# Patient Record
Sex: FEMALE
[De-identification: names, ages, dates, MRNs, and addresses within clinical notes are randomized per-mention and may not be internally consistent; named-entity substitution may affect disease eponyms.]

---

## 2023-12-18 PROBLEM — Z00.00 ENCOUNTER FOR PREVENTIVE HEALTH EXAMINATION: Status: ACTIVE | Noted: 2023-12-18

## 2023-12-20 ENCOUNTER — APPOINTMENT (OUTPATIENT)
Dept: HEPATOLOGY | Facility: CLINIC | Age: 68
End: 2023-12-20
Payer: MEDICARE

## 2023-12-20 VITALS
RESPIRATION RATE: 18 BRPM | BODY MASS INDEX: 33.74 KG/M2 | SYSTOLIC BLOOD PRESSURE: 161 MMHG | HEIGHT: 67 IN | WEIGHT: 214.95 LBS | HEART RATE: 80 BPM | TEMPERATURE: 97.1 F | DIASTOLIC BLOOD PRESSURE: 91 MMHG | OXYGEN SATURATION: 95 %

## 2023-12-20 DIAGNOSIS — R79.89 OTHER SPECIFIED ABNORMAL FINDINGS OF BLOOD CHEMISTRY: ICD-10-CM

## 2023-12-20 PROCEDURE — 99203 OFFICE O/P NEW LOW 30 MIN: CPT

## 2024-01-26 ENCOUNTER — NON-APPOINTMENT (OUTPATIENT)
Age: 69
End: 2024-01-26

## 2024-02-01 LAB
AFP-TM SERPL-MCNC: 7.8 NG/ML
GGT SERPL-CCNC: 280 U/L

## 2024-02-04 LAB
ALBUMIN SERPL ELPH-MCNC: 4.4 G/DL
ALP BLD-CCNC: 146 U/L
ALT SERPL-CCNC: 66 U/L
ANA PAT FLD IF-IMP: ABNORMAL
ANA PATTERN: ABNORMAL
ANA SER IF-ACNC: ABNORMAL
ANA TITER: ABNORMAL
ANION GAP SERPL CALC-SCNC: 14 MMOL/L
AST SERPL-CCNC: 61 U/L
BILIRUB SERPL-MCNC: 0.9 MG/DL
BUN SERPL-MCNC: 9 MG/DL
CALCIUM SERPL-MCNC: 9.6 MG/DL
CHLORIDE SERPL-SCNC: 98 MMOL/L
CO2 SERPL-SCNC: 23 MMOL/L
CREAT SERPL-MCNC: 0.7 MG/DL
EGFR: 94 ML/MIN/1.73M2
ENA SS-A AB SER IA-ACNC: <0.2 AL
ENA SS-B AB SER IA-ACNC: <0.2 AL
GLUCOSE SERPL-MCNC: 122 MG/DL
HCT VFR BLD CALC: 46.7 %
HGB BLD-MCNC: 16.3 G/DL
IGA SER QL IEP: 252 MG/DL
IGG SER QL IEP: 2241 MG/DL
IGG SER QL IEP: 2241 MG/DL
IGG1 SER-MCNC: 1148 MG/DL
IGG2 SER-MCNC: 878 MG/DL
IGG3 SER-MCNC: >216 MG/DL
IGG4 SER-MCNC: 79.2 MG/DL
IGM SER QL IEP: 376 MG/DL
MCHC RBC-ENTMCNC: 31.8 PG
MCHC RBC-ENTMCNC: 34.9 GM/DL
MCV RBC AUTO: 91 FL
MITOCHONDRIA AB SER IF-ACNC: ABNORMAL
PLATELET # BLD AUTO: 329 K/UL
POTASSIUM SERPL-SCNC: 4.1 MMOL/L
PROT SERPL-MCNC: 8.4 G/DL
RBC # BLD: 5.13 M/UL
RBC # FLD: 12.1 %
SMOOTH MUSCLE AB SER QL IF: NORMAL
SODIUM SERPL-SCNC: 135 MMOL/L
WBC # FLD AUTO: 4.57 K/UL

## 2024-02-17 NOTE — PHYSICAL EXAM
[General Appearance - Alert] : alert [General Appearance - In No Acute Distress] : in no acute distress [General Appearance - Well Nourished] : well nourished [General Appearance - Well Developed] : well developed [Sclera] : the sclera and conjunctiva were normal [Neck Appearance] : the appearance of the neck was normal [Respiration, Rhythm And Depth] : normal respiratory rhythm and effort [Exaggerated Use Of Accessory Muscles For Inspiration] : no accessory muscle use [Auscultation Breath Sounds / Voice Sounds] : lungs were clear to auscultation bilaterally [Apical Impulse] : the apical impulse was normal [Heart Rate And Rhythm] : heart rate was normal and rhythm regular [Heart Sounds] : normal S1 and S2 [Heart Sounds Gallop] : no gallops [Edema] : there was no peripheral edema [Bowel Sounds] : normal bowel sounds [Abdomen Soft] : soft [Abdomen Tenderness] : non-tender [] : no hepato-splenomegaly [Abdomen Mass (___ Cm)] : no abdominal mass palpated [Abnormal Walk] : normal gait [Skin Color & Pigmentation] : normal skin color and pigmentation [Skin Turgor] : normal skin turgor [No Focal Deficits] : no focal deficits [Oriented To Time, Place, And Person] : oriented to person, place, and time [Impaired Insight] : insight and judgment were intact [Affect] : the affect was normal [Spider Angioma] : No spider angioma(s) were observed [Abdominal  Ascites] : no ascites [Splenomegaly] : no splenomegaly [Caput Medusae] : no caput medusae observed [Asterixis] : no asterixis observed [Jaundice] : No jaundice

## 2024-02-17 NOTE — END OF VISIT
[FreeTextEntry3] : I have seen and examined patient at bedside. I agree with hx, ROS, physical examination, imp/suggestions as written by Ms. Tara Barajas NP. Please see my note. [Time Spent: ___ minutes] : I have spent [unfilled] minutes of time on the encounter.

## 2024-02-17 NOTE — HISTORY OF PRESENT ILLNESS
[FreeTextEntry1] : RFR: Elevated LFTs Referring MD: Dr. Oviedo PCP    Ms. Yvonne El is a 66y. F presenting today for an initial evaluation of elevated liver enzymes. PMHx of HTN, anxiety, HLD, arthritis. Alcohol use significant for 1 shot of vodka @ night since 7-8 years ago w/ passing of  "needed it to help me sleep". Has not had alcohol for 6 weeks now.   Ms. Nelson states that ~5 years ago, she recalls that she was very ill with the flu 2 weeks prior to her PCP visit and "took a lot of tylenol and medications".  As per the pt, her liver enzymes were noted to be elevated and went through a comprehensive workup, including imaging & biopsy @ [facility], but all studies were "normal". Also endorses recent history of taking aleve, tylenol, and vodka to help with R. wrist pain/ arthritic pain. Again, presented to PCP a few weeks after and was noted have elevated LFTs. Pt states that she has stopped ETOH and made complete lifestyle/ dietary changes 6 weeks ago when she was notified about her elevated LFTs. Cut out carbs, changed to high protein diet and is noticing significant weight loss. Additionally, the pt shares that she has is currently taking simvastatin 20 mg (was on 40 mg for 20 years), but has been taking this medication for 25 years and never has had an issue, but given recent LFT elevations, Dr. Sosa decreased her dose to 20mg.   Today, the pt denies chest pain, SOB, palpitations, lightheadedness/ dizziness, melena, itching, dry eyes/ mouth or acute hospitalizations or ER visits.   [Other Medical Hx] See HPI  [Surgical Hx] R. wrist ORIF 2015, 2016, R. breast bx & lumpectomy 30 years ago    [Allergies] NKDA, NKA     [Medications] xanax 0.25 mg PRN, atenolol HCTZ 50/25 once daily, potassium 10 mEq daily, vitamin D 2000 daily   [FmH of liver disease] Denies    [Social Hx] - Occupation: , retired  - Alcohol: Quit 6 weeks ago. ETOH 7-8 years ago to cope with passing of   - Tobacco: Denies - Illicit drug: Denies        - Herb and dietary Supplement: Denies   [Labs] 11/28/23 (transcribed from results from pt's phone chart)  Tb 0.9 , AST/ALT 72/ 76, AlkP 144  AFP 8.3 AMA 78 (H) Mitochondrial Ab 153     11/4/23 (transcribed from results from pt's phone chart)  H/H 15.7 46.2 MCV 92 Tb 1.0, AST/ALT 71/82, AlkP 149    Ferritin 309 (H), TIBC 363, UIBC 233, Iron 130 WNL, iron 36  HbsAg Negative, HbcAb negative, Hb surface Ab quant 36 HCV non reactive, HIV non reactive  HgbA1c 5.8 (H)   11/2/23 (transcribed from results from pt's phone chart)  IgM 448, IgG 2500   11/18/22 (transcribed from results from pt's phone chart)  AST/ALT 47/44, AlkP 11 Tb 1.2   [Imaging] - Fibroscan: - US Abd: "many years ago, was negative" - MRI/ MRCP:    [Procedures]  EGD: never had, "i dont plan to" Colonoscopy: 2022 never had, but had cologuard. was - 
clear

## 2024-02-17 NOTE — ASSESSMENT
[FreeTextEntry1] : Ms. Yvonne El is a 66y. F presenting today for an initial evaluation of elevated liver enzymes. Hx of previous liver bx @ [facility] with no concerning findings as per pt. Additional PMHx of HTN, anxiety, HLD, arthritis.   12/20/23: Fibroscan , kPa 4.7, IQR15% S0,F0  Diff dx: PBC (AMA+, + IgG)  - Early stage  - Denying dry mouth, dry eyes - AMA +, IgG +  - Educated pt about likely dx of PBC and disease progression. Reassurance provided. Also advised to continue ETOH cessation  - Discussed possibly starting ursodiol, educated about medication and minimal side effect profile. However, will defer for now until 5 week labs result. Decision to be made at next visit. Encouraged pt to obtain bx report from [facility].   Plan - Defer ursodiol until next visit, need to assess b/w results  - abd US prior to next visit  - repeat b/w 5 weeks from now  - RTO 6 weeks  Plans received well by pt, all questions and concerns answered by conclusion of visit

## 2024-02-17 NOTE — REVIEW OF SYSTEMS
[Anxiety] : anxiety [Fever] : no fever [Chills] : no chills [Feeling Poorly] : not feeling poorly [Feeling Tired] : not feeling tired [Heart Rate Is Slow] : the heart rate was not slow [Heart Rate Is Fast] : the heart rate was not fast [Chest Pain] : no chest pain [Palpitations] : no palpitations [Shortness Of Breath] : no shortness of breath [Wheezing] : no wheezing [Cough] : no cough [SOB on Exertion] : no shortness of breath during exertion [Abdominal Pain] : no abdominal pain [Vomiting] : no vomiting [Constipation] : no constipation [Diarrhea] : no diarrhea [Skin Lesions] : no skin lesions [Skin Wound] : no skin wound [Confused] : no confusion [Depression] : no depression

## 2024-03-11 ENCOUNTER — NON-APPOINTMENT (OUTPATIENT)
Age: 69
End: 2024-03-11

## 2024-03-12 LAB
ALBUMIN SERPL ELPH-MCNC: 4.6 G/DL
ALP BLD-CCNC: 113 U/L
ALT SERPL-CCNC: 36 U/L
ANION GAP SERPL CALC-SCNC: 13 MMOL/L
AST SERPL-CCNC: 32 U/L
BASOPHILS # BLD AUTO: 0.04 K/UL
BASOPHILS NFR BLD AUTO: 0.8 %
BILIRUB SERPL-MCNC: 0.8 MG/DL
BUN SERPL-MCNC: 10 MG/DL
CALCIUM SERPL-MCNC: 10.3 MG/DL
CHLORIDE SERPL-SCNC: 98 MMOL/L
CO2 SERPL-SCNC: 26 MMOL/L
CREAT SERPL-MCNC: 0.8 MG/DL
EGFR: 80 ML/MIN/1.73M2
EOSINOPHIL # BLD AUTO: 0.09 K/UL
EOSINOPHIL NFR BLD AUTO: 1.8 %
GLUCOSE SERPL-MCNC: 134 MG/DL
HCT VFR BLD CALC: 47.1 %
HGB BLD-MCNC: 16.2 G/DL
IMM GRANULOCYTES NFR BLD AUTO: 0.2 %
LYMPHOCYTES # BLD AUTO: 1.23 K/UL
LYMPHOCYTES NFR BLD AUTO: 24.7 %
MAN DIFF?: NORMAL
MCHC RBC-ENTMCNC: 31.2 PG
MCHC RBC-ENTMCNC: 34.4 GM/DL
MCV RBC AUTO: 90.8 FL
MONOCYTES # BLD AUTO: 0.48 K/UL
MONOCYTES NFR BLD AUTO: 9.6 %
NEUTROPHILS # BLD AUTO: 3.13 K/UL
NEUTROPHILS NFR BLD AUTO: 62.9 %
PLATELET # BLD AUTO: 312 K/UL
POTASSIUM SERPL-SCNC: 4.2 MMOL/L
PROT SERPL-MCNC: 8.4 G/DL
RBC # BLD: 5.19 M/UL
RBC # FLD: 12.8 %
SODIUM SERPL-SCNC: 136 MMOL/L
WBC # FLD AUTO: 4.98 K/UL

## 2024-03-18 ENCOUNTER — APPOINTMENT (OUTPATIENT)
Dept: HEPATOLOGY | Facility: CLINIC | Age: 69
End: 2024-03-18

## 2024-06-19 LAB
ALBUMIN SERPL ELPH-MCNC: 4.4 G/DL
ALP BLD-CCNC: 101 U/L
ALT SERPL-CCNC: 27 U/L
ANION GAP SERPL CALC-SCNC: 16 MMOL/L
AST SERPL-CCNC: 26 U/L
BASOPHILS # BLD AUTO: 0.03 K/UL
BASOPHILS NFR BLD AUTO: 0.8 %
BILIRUB SERPL-MCNC: 0.7 MG/DL
BUN SERPL-MCNC: 12 MG/DL
CALCIUM SERPL-MCNC: 9.6 MG/DL
CHLORIDE SERPL-SCNC: 102 MMOL/L
CO2 SERPL-SCNC: 20 MMOL/L
CREAT SERPL-MCNC: 0.7 MG/DL
EGFR: 94 ML/MIN/1.73M2
EOSINOPHIL # BLD AUTO: 0.11 K/UL
EOSINOPHIL NFR BLD AUTO: 2.8 %
GLUCOSE SERPL-MCNC: 121 MG/DL
HCT VFR BLD CALC: 45.8 %
HGB BLD-MCNC: 15.6 G/DL
IGG SER QL IEP: 1616 MG/DL
IMM GRANULOCYTES NFR BLD AUTO: 0.5 %
LYMPHOCYTES # BLD AUTO: 0.79 K/UL
LYMPHOCYTES NFR BLD AUTO: 20.2 %
MAN DIFF?: NORMAL
MCHC RBC-ENTMCNC: 31.2 PG
MCHC RBC-ENTMCNC: 34.1 GM/DL
MCV RBC AUTO: 91.6 FL
MONOCYTES # BLD AUTO: 0.43 K/UL
MONOCYTES NFR BLD AUTO: 11 %
NEUTROPHILS # BLD AUTO: 2.53 K/UL
NEUTROPHILS NFR BLD AUTO: 64.7 %
PLATELET # BLD AUTO: 298 K/UL
POTASSIUM SERPL-SCNC: 3.6 MMOL/L
PROT SERPL-MCNC: 7.8 G/DL
RBC # BLD: 5 M/UL
RBC # FLD: 12.2 %
SODIUM SERPL-SCNC: 139 MMOL/L
WBC # FLD AUTO: 3.91 K/UL

## 2024-06-19 RX ORDER — URSODIOL 500 MG/1
500 TABLET ORAL TWICE DAILY
Qty: 60 | Refills: 5 | Status: ACTIVE | COMMUNITY
Start: 2024-01-29 | End: 1900-01-01

## 2024-06-24 ENCOUNTER — APPOINTMENT (OUTPATIENT)
Dept: HEPATOLOGY | Facility: CLINIC | Age: 69
End: 2024-06-24
Payer: MEDICARE

## 2024-06-24 VITALS
WEIGHT: 209 LBS | BODY MASS INDEX: 32.8 KG/M2 | SYSTOLIC BLOOD PRESSURE: 143 MMHG | HEIGHT: 67 IN | TEMPERATURE: 97.9 F | DIASTOLIC BLOOD PRESSURE: 86 MMHG | HEART RATE: 71 BPM | RESPIRATION RATE: 18 BRPM | OXYGEN SATURATION: 94 %

## 2024-06-24 DIAGNOSIS — D58.2 OTHER HEMOGLOBINOPATHIES: ICD-10-CM

## 2024-06-24 DIAGNOSIS — K74.3 PRIMARY BILIARY CIRRHOSIS: ICD-10-CM

## 2024-06-24 PROCEDURE — 99214 OFFICE O/P EST MOD 30 MIN: CPT

## 2024-08-27 ENCOUNTER — APPOINTMENT (OUTPATIENT)
Dept: FAMILY MEDICINE | Facility: CLINIC | Age: 69
End: 2024-08-27

## 2024-12-09 ENCOUNTER — APPOINTMENT (OUTPATIENT)
Dept: HEPATOLOGY | Facility: CLINIC | Age: 69
End: 2024-12-09

## 2025-05-20 ENCOUNTER — NON-APPOINTMENT (OUTPATIENT)
Age: 70
End: 2025-05-20

## 2025-05-21 ENCOUNTER — APPOINTMENT (OUTPATIENT)
Dept: HEPATOLOGY | Facility: CLINIC | Age: 70
End: 2025-05-21

## 2025-05-21 VITALS
DIASTOLIC BLOOD PRESSURE: 72 MMHG | BODY MASS INDEX: 32.18 KG/M2 | SYSTOLIC BLOOD PRESSURE: 151 MMHG | OXYGEN SATURATION: 95 % | HEART RATE: 89 BPM | HEIGHT: 67 IN | TEMPERATURE: 96.6 F | RESPIRATION RATE: 17 BRPM | WEIGHT: 205 LBS

## 2025-05-21 DIAGNOSIS — R79.89 OTHER SPECIFIED ABNORMAL FINDINGS OF BLOOD CHEMISTRY: ICD-10-CM

## 2025-05-21 DIAGNOSIS — K74.3 PRIMARY BILIARY CIRRHOSIS: ICD-10-CM

## 2025-05-21 DIAGNOSIS — D58.2 OTHER HEMOGLOBINOPATHIES: ICD-10-CM

## 2025-05-21 LAB
ALBUMIN SERPL ELPH-MCNC: 4.4 G/DL
ALP BLD-CCNC: 93 U/L
ALT SERPL-CCNC: 24 U/L
ANION GAP SERPL CALC-SCNC: 16 MMOL/L
AST SERPL-CCNC: 24 U/L
BASOPHILS # BLD AUTO: 0.03 K/UL
BASOPHILS NFR BLD AUTO: 0.7 %
BILIRUB SERPL-MCNC: 0.8 MG/DL
BUN SERPL-MCNC: 11 MG/DL
CALCIUM SERPL-MCNC: 10 MG/DL
CHLORIDE SERPL-SCNC: 102 MMOL/L
CO2 SERPL-SCNC: 21 MMOL/L
CREAT SERPL-MCNC: 0.68 MG/DL
EGFRCR SERPLBLD CKD-EPI 2021: 94 ML/MIN/1.73M2
EOSINOPHIL # BLD AUTO: 0.09 K/UL
EOSINOPHIL NFR BLD AUTO: 2.2 %
GGT SERPL-CCNC: 24 U/L
GLUCOSE SERPL-MCNC: 112 MG/DL
HCT VFR BLD CALC: 43.8 %
HGB BLD-MCNC: 15.3 G/DL
IGG SERPL-MCNC: 1405 MG/DL
IMM GRANULOCYTES NFR BLD AUTO: 0.2 %
LYMPHOCYTES # BLD AUTO: 0.74 K/UL
LYMPHOCYTES NFR BLD AUTO: 18.3 %
MAN DIFF?: NORMAL
MCHC RBC-ENTMCNC: 30.7 PG
MCHC RBC-ENTMCNC: 34.9 G/DL
MCV RBC AUTO: 88 FL
MONOCYTES # BLD AUTO: 0.37 K/UL
MONOCYTES NFR BLD AUTO: 9.2 %
NEUTROPHILS # BLD AUTO: 2.8 K/UL
NEUTROPHILS NFR BLD AUTO: 69.4 %
PLATELET # BLD AUTO: 226 K/UL
POTASSIUM SERPL-SCNC: 3.7 MMOL/L
PROT SERPL-MCNC: 7.5 G/DL
RBC # BLD: 4.98 M/UL
RBC # FLD: 12.3 %
SODIUM SERPL-SCNC: 139 MMOL/L
WBC # FLD AUTO: 4.04 K/UL

## 2025-05-21 PROCEDURE — 99214 OFFICE O/P EST MOD 30 MIN: CPT
